# Patient Record
Sex: FEMALE | Race: WHITE | Employment: OTHER | URBAN - NONMETROPOLITAN AREA
[De-identification: names, ages, dates, MRNs, and addresses within clinical notes are randomized per-mention and may not be internally consistent; named-entity substitution may affect disease eponyms.]

---

## 2021-01-26 ENCOUNTER — OUTSIDE SERVICES (OUTPATIENT)
Dept: FAMILY MEDICINE CLINIC | Age: 86
End: 2021-01-26
Payer: MEDICARE

## 2021-01-26 DIAGNOSIS — R73.01 IFG (IMPAIRED FASTING GLUCOSE): ICD-10-CM

## 2021-01-26 DIAGNOSIS — M15.9 OSTEOARTHRITIS OF MULTIPLE JOINTS, UNSPECIFIED OSTEOARTHRITIS TYPE: ICD-10-CM

## 2021-01-26 DIAGNOSIS — E55.9 VITAMIN D DEFICIENCY: ICD-10-CM

## 2021-01-26 DIAGNOSIS — C44.90 SKIN CANCER: ICD-10-CM

## 2021-01-26 DIAGNOSIS — E78.5 HYPERLIPIDEMIA, UNSPECIFIED HYPERLIPIDEMIA TYPE: ICD-10-CM

## 2021-01-26 DIAGNOSIS — I10 ESSENTIAL HYPERTENSION: Primary | ICD-10-CM

## 2021-01-26 DIAGNOSIS — K59.00 CONSTIPATION, UNSPECIFIED CONSTIPATION TYPE: ICD-10-CM

## 2021-01-26 NOTE — PROGRESS NOTES
1/26/2021    Galen Blount  3/27/1928    This resident is being seen today for month visitt. She is a resident who has long-term medical conditions including essential hypertension constipation skin cancer of the forehead progressive debilitation hyperlipidemia vitamin D deficiency osteoarthritis and impaired fasting glucose. .  She is a 80 y.o. female resident who is being seen today for chronic conditions. Currently at this time she denies any complaints of chest pain or palpitations. Denies any headaches, any sore throat, coughing, or shortness of breath. No nausea, vomiting, constipation or diarrhea. No pain in lower extremities. No fever, or chills. No recent falls or syncopal events. Objective     Vital Signs: Pressure 142/80 pulse 86 respirations 24 temp 96.9 saturation 96% and weight is 172.4 pounds    Physical examination:Skin is essentially warm and dry. HEENT unremarkable. Neck is supple. Heart regular rate and rhythm. No rubs, gallops or murmurs noted. Lungs are clear to auscultation. No evidence of rhonchi, rales, or wheezing. Abdomen is soft, supple and non-tender. Bowel sounds are noted x4 quadrants. No rigidity, guarding or rebound tenderness. Negative Chavira's, negative McBurney's, negative Darrius's. Extremities; no true pitting edema. Pulses are adequate. No clubbing  or no cyanosis noted. Neurologically she  is alert and oriented x3. No evidence of paralysis or paresthesias noted. Utilizing a wheeled walker.     Diagnoses and all orders for this visit:    Essential hypertension    Constipation, unspecified constipation type    Skin cancer    IFG (impaired fasting glucose)    Hyperlipidemia, unspecified hyperlipidemia type    Vitamin D deficiency    Osteoarthritis of multiple joints, unspecified osteoarthritis type Patient's medications and labs are reviewed. She is due for blood work in 3 months including CBC CMP lipids TSH vitamin D hemoglobin A1c and then we will see her in the office following that lab work. Blanche Lubin, MONY - CNP        *Note was creating using voice recognition software.   The document was reviewed however grammatical errors may exist.

## 2021-03-01 RX ORDER — ERGOCALCIFEROL 1.25 MG/1
CAPSULE ORAL
Qty: 12 CAPSULE | Refills: 1 | Status: SHIPPED
Start: 2021-03-01 | End: 2021-10-14

## 2021-04-29 ENCOUNTER — OUTSIDE SERVICES (OUTPATIENT)
Dept: FAMILY MEDICINE CLINIC | Age: 86
End: 2021-04-29
Payer: MEDICARE

## 2021-04-29 DIAGNOSIS — E55.9 VITAMIN D DEFICIENCY: ICD-10-CM

## 2021-04-29 DIAGNOSIS — I10 ESSENTIAL HYPERTENSION: ICD-10-CM

## 2021-04-29 DIAGNOSIS — E78.5 HYPERLIPIDEMIA, UNSPECIFIED HYPERLIPIDEMIA TYPE: ICD-10-CM

## 2021-04-29 DIAGNOSIS — R79.89 ELEVATED TSH: Primary | ICD-10-CM

## 2021-04-29 DIAGNOSIS — K59.00 CONSTIPATION, UNSPECIFIED CONSTIPATION TYPE: ICD-10-CM

## 2021-04-30 NOTE — PROGRESS NOTES
4/29/2021    Ino [facility]  3/27/1928    This resident is being seen today for a 3-month follow-up evaluation visit. She is a resident who has long-term medical conditions including hypertension, constipation, skin cancer to the forehead, progressive debilitation, hyperlipidemia, vitamin D deficiency, osteoarthritis, and impaired fasting glucose. She is a 80 y.o. female resident who is being seen today for a 3-month follow-up evaluation. I did note on the most recent lab evaluation that her TSH was very slightly elevated at 5.99 and previously of 4.99. I did explain to her that if this does continue to trend upward, then we may consider initiation of Synthroid therapies. Resident and I furthermore discussed her cholesterol of 201 which was previously 186 and her LDL of 126 previously 118 and the benefits of exercise, given the fact that she cannot control her diet. Resident did indicate that she may go to the fifth floor or increase her walking in the hallways. She furthermore states that she has had some ongoing constipation, but specifically refuses treatment. She indicates that she has had no pain, headaches or dizziness, sore throat, coughing or shortness of breath, chest pain, nausea or vomiting, diarrhea, fever or chills, recent falls or syncopal events. Medications:  Vitamin D2 50,000 units weekly  Timolol 1 drop twice a day to both eyes  Voltaren gel 4 g to the right knee twice daily  Acetaminophen 650 mg every 4 hours as needed  Senokot-S twice daily  Norvasc 10 mg daily  Lisinopril 10 mg daily  Hydrochlorothiazide 12.5 mg daily      Objective     Vital Signs: /67 pulse 75 respirations 18 temperature 96.6 O2 91% weight 179 pounds      Physical examination:Skin is essentially warm and dry. HEENT unremarkable. Neck is supple. Heart regular rate and rhythm. No rubs, gallops or murmurs noted. Lungs are clear to auscultation. No evidence of rhonchi, rales, or wheezing.  Abdomen is soft, supple and non-tender. Bowel sounds are noted x4 quadrants. No rigidity, guarding or rebound tenderness. Negative Chavira's, negative McBurney's, negative Darrius's. Extremities; no true pitting edema. Pulses are adequate. No clubbing  or no cyanosis noted. Neurologically she  is alert and oriented x3. No evidence of paralysis or paresthesias noted. Diagnoses and all orders for this visit:    Elevated TSH  Comments:  Reevaluate TSH in 6 weeks and determine need for Synthroid    Hyperlipidemia, unspecified hyperlipidemia type  Comments:  Cholesterol high with LDL with diet and exercise discussed    Constipation, unspecified constipation type  Comments:  Resident refuses treatment    Vitamin D deficiency  Comments:  Stable with vitamin D supplementation and close observation of vitamin D    Essential hypertension  Comments:  Controlled with lisinopril with hydrochlorothiazide      Plan: Plan of care was discussed with the healthcare team with meds and labs reviewed. Recent labs from 4/20/2021 with a cholesterol of 201 previously of 186  previously 118 BUN/creatinine 19/0.78 with a GFR of 66 hemoglobin A1c 5.1 TSH 5.99 previously 4.99 H/H 13/38. 1. Again, resident and I did discuss exercise to help with her cholesterol, given the fact that she states she has no control of her diet. We also furthermore discussed the upward trend of her's TSH and the possible need for Synthroid if it continues to rise. I did ask that we have a repeat TSH in the course of 6 weeks for further reevaluation in this regard. Again, resident does refuse any treatment for her complaints of constipation. The only other recommendations at this time would be for a follow-up in 3 months with lab work prior to that visit which will include a CBC, CMP, lipid panel, vitamin D and TSH.   I will furthermore continue forth with the medical regimen as stated and I will track her intakes, monitor her weights and behaviors, and see her routinely and as needed with further orders forthcoming. AIDAN HEARD, APRN - CNP      *Note was creating using voice recognition software.   The document was reviewed however grammatical errors may exist.

## 2021-09-16 ENCOUNTER — OUTSIDE SERVICES (OUTPATIENT)
Dept: FAMILY MEDICINE CLINIC | Age: 86
End: 2021-09-16
Payer: MEDICARE

## 2021-09-16 DIAGNOSIS — K59.00 CONSTIPATION, UNSPECIFIED CONSTIPATION TYPE: ICD-10-CM

## 2021-09-16 DIAGNOSIS — M15.9 OSTEOARTHRITIS OF MULTIPLE JOINTS, UNSPECIFIED OSTEOARTHRITIS TYPE: ICD-10-CM

## 2021-09-16 DIAGNOSIS — E03.9 HYPOTHYROIDISM, UNSPECIFIED TYPE: Primary | ICD-10-CM

## 2021-09-16 DIAGNOSIS — I10 ESSENTIAL HYPERTENSION: ICD-10-CM

## 2021-09-16 DIAGNOSIS — Z91.14 HISTORY OF MEDICATION NONCOMPLIANCE: ICD-10-CM

## 2021-09-16 NOTE — PROGRESS NOTES
9/16/2021    Zachery Tucker  3/27/1928    This resident is being seen today for an acute evaluation visit. She is a resident who has long-term medical conditions including hypertension, constipation, skin cancer to the forehead, progressive debilitation, hyperlipidemia, vitamin D deficiency, osteoarthritis, and impaired fasting glucose. She is a 80 y.o. female resident who is being seen today for an acute evaluation with respect to noncompliance with medications. Resident was accompanied by her daughter on today's evaluation and we did discuss all of her medications. Daughter states that she is aware of the situation and has placed all of her mother's pills in a pillbox and states that she has been taking them regularly over the course of the past 4 days. She states that she has been a continue to monitor her very closely with respect to this. It is of note to mention that she did have an abnormally high TSH and we had previously given recommendations to upward titrate the levothyroxine, until we were aware of the fact that she had not been taking her medications properly. Medications:  Vitamin D2 50,000 units weekly  Timolol 1 drop twice a day to both eyes  Voltaren gel 4 g to the right knee twice daily  Acetaminophen 650 mg every 4 hours as needed  Norvasc 10 mg daily  Lisinopril 10 mg daily  Hydrochlorothiazide 12.5 mg daily  Synthroid 25 mcg daily      Objective     Vital Signs: /76 pulse 70 respirations 20 temperature 97.2 O2 94% weight 180.4 pounds      Physical examination:Skin is essentially warm and dry. HEENT hard of hearing but otherwise unremarkable. Neck is supple. Heart regular rate and rhythm. No rubs, gallops or murmurs noted. Lungs are clear to auscultation. No evidence of rhonchi, rales, or wheezing. Abdomen is soft, supple and non-tender. Bowel sounds are noted x4 quadrants. No rigidity, guarding or rebound tenderness.   Negative Chavira's, negative McBurney's, negative Darrius's. Extremities; no true pitting edema. Pulses are adequate. No clubbing  or no cyanosis noted. Neurologically she  is alert and oriented x3. No evidence of paralysis or paresthesias noted. Diagnoses and all orders for this visit:    Hypothyroidism, unspecified type  Comments:  Maintain current dose of Synthroid 25 mcg and repeat TSH in 2 months    History of medication noncompliance  Comments:  Resident's daughter to place pills in box daily    Osteoarthritis of multiple joints, unspecified osteoarthritis type  Comments:  Maintain vitamin supplementation    Constipation, unspecified constipation type  Comments:  Controlled with discontinuation of Senokot    Essential hypertension  Comments:  Controlled with Norvasc with lisinopril/hydrochlorothiazide      Plan: Plan of care was discussed with the healthcare team with meds and labs reviewed. Resident did have recent TSH 6.81, which has been trending for the last several months. I did discuss this with her daughter and the need for the administration of the Synthroid. Resident did agree to take the medication as directed. Daughter also asked if we could combine the lisinopril and hydrochlorothiazide, as it had been in times past. Staffing did call [employer] and had this taken care of. I did inform the resident and her daughter that it would be wise to obtain a repeat TSH in 2 months after she had been taking the medication properly and at that time I would like to obtain a vitamin D with a CBC, CMP and a B12 level. Resident was agreeable. I would furthermore like to follow-up with this resident in the course of 3 months to go over her labs. I will otherwise continue forth with her current plan of care as stated and I will see her routinely and as needed with further orders forthcoming. AIDAN HEARD, APRN - CNP      *Note was creating using voice recognition software.   The document was reviewed however grammatical errors may exist.

## 2021-10-14 RX ORDER — ERGOCALCIFEROL 1.25 MG/1
CAPSULE ORAL
Qty: 12 CAPSULE | Refills: 1 | Status: SHIPPED | OUTPATIENT
Start: 2021-10-14

## 2021-10-28 ENCOUNTER — OUTSIDE SERVICES (OUTPATIENT)
Dept: FAMILY MEDICINE CLINIC | Age: 86
End: 2021-10-28
Payer: MEDICARE

## 2021-10-28 DIAGNOSIS — E03.9 HYPOTHYROIDISM, UNSPECIFIED TYPE: ICD-10-CM

## 2021-10-28 DIAGNOSIS — E78.5 HYPERLIPIDEMIA, UNSPECIFIED HYPERLIPIDEMIA TYPE: ICD-10-CM

## 2021-10-28 DIAGNOSIS — R60.0 UNILATERAL EDEMA OF LOWER EXTREMITY: Primary | ICD-10-CM

## 2021-10-28 DIAGNOSIS — E55.9 VITAMIN D DEFICIENCY: ICD-10-CM

## 2021-10-28 DIAGNOSIS — Z91.14 HISTORY OF MEDICATION NONCOMPLIANCE: ICD-10-CM

## 2021-10-28 NOTE — PROGRESS NOTES
10/28/2021    Yudith Grye  3/27/1928    This resident is being seen today for a follow-up evaluation visit. She is a resident who has long-term medical conditions including hypertension, constipation, skin cancer to the forehead, progressive debilitation, hyperlipidemia, vitamin D deficiency, osteoarthritis, and impaired fasting glucose. She is a 80 y.o. female resident who is being seen today for a follow-up evaluation visit with resident seen in conjunction with her daughter. Resident admits to constipation from time to time but states that she feels that she is otherwise been doing well. Her daughter feels that the resident has been better in terms of her medication noncompliance. She does admit that there are still times when her mom does miss her pills. Resident denies any current complaints of pain, headaches or dizziness, sore throat, chest pain, coughing or shortness of breath, nausea or vomiting, constipation or diarrhea, dysuria or frequency, fever or chills, falls or syncopal events. Medications:  Vitamin D2 50,000 units weekly  Timolol 1 drop twice a day to both eyes  Voltaren gel 4 g to the right knee twice daily  Acetaminophen 650 mg every 4 hours as needed  Norvasc 10 mg daily  Lisinopril 10 mg daily  Hydrochlorothiazide 12.5 mg daily  Synthroid 25 mcg daily      Objective     Vital Signs: /73 pulse 85 respirations 18 temperature 97.2 O2 98% weight 176 pounds      Physical examination:Skin is essentially warm and dry. HEENT hard of hearing but otherwise unremarkable. Neck is supple. Heart regular rate and rhythm. No rubs, gallops or murmurs noted. Lungs are clear to auscultation. No evidence of rhonchi, rales, or wheezing. Abdomen is soft, supple and non-tender. Bowel sounds are noted x4 quadrants. No rigidity, guarding or rebound tenderness. Negative Chavira's, negative McBurney's, negative Darrius's.   Extremities; she does appear to have some degree of edema to the lower extremities with the left lower extremity being greater than the right without redness or warmth noted. Pulses are adequate. No clubbing  or no cyanosis noted. Neurologically she  is alert and oriented x3. No evidence of paralysis or paresthesias noted. Diagnoses and all orders for this visit:    Unilateral edema of lower extremity  Comments:  Ultrasound left lower extremity to rule out DVT    History of medication noncompliance  Comments:  Improved as noted per her daughter    Hypothyroidism, unspecified type  Comments:  Resident has repeat TSH pending and will maintain the current dosing as recommended    Vitamin D deficiency  Comments:  Stable with vitamin D supplementation with close observation of vitamin D level    Hyperlipidemia, unspecified hyperlipidemia type  Comments:  Currently controlled with close observation of lipid panel      Plan: Plan of care was discussed with the healthcare team with meds and labs reviewed. I do have concerns regarding her lower extremity and question of DVT. I am therefore going to recommend an ultrasound study to the left lower extremity to rule out any DVT formation. Furthermore, I am been to cancel her appointment scheduled for December and asked that she follow-up with me in the course of the next 3 months. At that time, I should have her labs to review and determine if she needs any changes with respect to her Synthroid dosing, as long as she maintains medication compliance. I will therefore maintain her plan of care as indicated at this time and I will see her routinely and as needed with further orders forthcoming. AIDAN HEARD, APRN - CNP      *Note was creating using voice recognition software.   The document was reviewed however grammatical errors may exist.

## 2022-01-27 ENCOUNTER — OUTSIDE SERVICES (OUTPATIENT)
Dept: FAMILY MEDICINE CLINIC | Age: 87
End: 2022-01-27
Payer: MEDICARE

## 2022-01-27 DIAGNOSIS — E87.1 HYPONATREMIA: Primary | ICD-10-CM

## 2022-01-27 DIAGNOSIS — I10 ESSENTIAL HYPERTENSION: ICD-10-CM

## 2022-01-27 DIAGNOSIS — E78.5 HYPERLIPIDEMIA, UNSPECIFIED HYPERLIPIDEMIA TYPE: ICD-10-CM

## 2022-01-27 DIAGNOSIS — K59.00 CONSTIPATION, UNSPECIFIED CONSTIPATION TYPE: ICD-10-CM

## 2022-01-27 DIAGNOSIS — M15.9 OSTEOARTHRITIS OF MULTIPLE JOINTS, UNSPECIFIED OSTEOARTHRITIS TYPE: ICD-10-CM

## 2022-01-27 NOTE — PROGRESS NOTES
1/27/2022    Rigobertofrances Zuniga  3/27/1928    This resident is being seen today for a follow-up evaluation visit. She is a resident who has long-term medical conditions including hypertension, constipation, skin cancer to the forehead, progressive debilitation, hyperlipidemia, vitamin D deficiency, osteoarthritis, and impaired fasting glucose. She is a 80 y.o. female resident who is being seen today for a follow-up evaluation/3-month visit. She was joined by her daughter and they have indicated that she has been taking her medications appropriately. It is of note to mention that upon my last evaluation that I had recommended an ultrasound study to her left lower extremity due to unilateral edema, which was negative for any DVT formation. Resident indicates that she had been doing well and denies any complaints of pain, headaches or dizziness, sore throat, chest pain, coughing or shortness of breath, nausea or vomiting, constipation or diarrhea, dysuria or frequency, fever or chills, falls or syncopal events. Medications:  Vitamin D2 50,000 units weekly  Timolol 1 drop twice a day to both eyes  Voltaren gel 4 g to the right knee twice daily  Acetaminophen 650 mg every 4 hours as needed  Norvasc 10 mg daily  Lisinopril 10 mg daily  Hydrochlorothiazide 12.5 mg daily  Synthroid 25 mcg daily      Objective     Vital Signs: /80 with a recheck of 136/71 pulse 76 respirations 20 temperature 97.2 O2 97 % weight 178.4 pounds      Physical examination:Skin is essentially warm and dry. HEENT hard of hearing but otherwise unremarkable. Neck is supple. Heart regular rate and rhythm. No rubs, gallops or murmurs noted. Lungs are clear to auscultation. No evidence of rhonchi, rales, or wheezing. Abdomen is soft, supple and non-tender. Bowel sounds are noted x4 quadrants. No rigidity, guarding or rebound tenderness. Negative Chavira's, negative McBurney's, negative Darrius's.   Extremities; left lower extremity greater than the right lower extremity, which appears to be chronic in nature at this point. Pulses are adequate. No clubbing  or no cyanosis noted. Neurologically she  is alert and oriented x3. No evidence of paralysis or paresthesias noted. Diagnoses and all orders for this visit:    Hyponatremia  Comments:  Currently relatively stable with close observation with low-dose diuretic management    Essential hypertension  Comments:  Controlled with Norvasc, lisinopril and hydrochlorothiazide    Osteoarthritis of multiple joints, unspecified osteoarthritis type  Comments:  Stable with Tylenol as needed    Constipation, unspecified constipation type  Comments:  Controlled and asymptomatic at this time    Hyperlipidemia, unspecified hyperlipidemia type  Comments:  Controlled with close observation of lipid panel      Plan: Plan of care was discussed with the healthcare team with meds and labs reviewed. Lipid panel within normal limits BUNs/creatinine 23/0.93 with GFR 53 sodium of 129 previously 128 H/H 12.2/36.1 vitamin D is 65 TSH 3.39. As stated, ultrasound work-up was negative for any DVT and her swelling does appear to be somewhat chronic in nature. I will therefore maintain her current medical regimen as directed and recommend some follow-up labs prior to her next visit with the inclusion of a CBC, CMP, vitamin D, lipid panel and a TSH. I will furthermore plan to follow-up with her in the course of 4 months. AIDAN HEARD, APRN - CNP      *Note was creating using voice recognition software.   The document was reviewed however grammatical errors may exist.

## 2022-05-26 ENCOUNTER — OUTSIDE SERVICES (OUTPATIENT)
Dept: FAMILY MEDICINE CLINIC | Age: 87
End: 2022-05-26
Payer: MEDICARE

## 2022-05-26 DIAGNOSIS — E78.5 HYPERLIPIDEMIA, UNSPECIFIED HYPERLIPIDEMIA TYPE: ICD-10-CM

## 2022-05-26 DIAGNOSIS — R60.0 UNILATERAL EDEMA OF LOWER EXTREMITY: ICD-10-CM

## 2022-05-26 DIAGNOSIS — E03.9 HYPOTHYROIDISM, UNSPECIFIED TYPE: ICD-10-CM

## 2022-05-26 DIAGNOSIS — H61.21 IMPACTED CERUMEN OF RIGHT EAR: Primary | ICD-10-CM

## 2022-05-26 DIAGNOSIS — I10 ESSENTIAL HYPERTENSION: ICD-10-CM

## 2022-05-26 NOTE — PROGRESS NOTES
5/26/2022    Ashley Camacho  3/27/1928    This resident is being seen today for a follow-up evaluation visit. She is a resident who has long-term medical conditions including hypertension, constipation, skin cancer to the forehead, progressive debilitation, hyperlipidemia, vitamin D deficiency, osteoarthritis, and impaired fasting glucose. She is a 80 y.o. female resident who is being seen today for a follow-up visit with resident remaining fairly alert and oriented and her daughter was present throughout the evaluation. She has been under assessment for some chronic edema to the left lower extremity, with ultrasound study work-up negative for DVT. She currently denies complaints in terms of pain, headaches or dizziness, sore throat, chest pain, coughing or shortness of breath, nausea or vomiting, constipation or diarrhea, dysuria or frequency, fever or chills, falls or syncopal events. Medications:  Vitamin D2 50,000 units weekly  Timolol 1 drop twice a day to both eyes  Voltaren gel 4 g to the right knee twice daily  Acetaminophen 650 mg every 4 hours as needed  Norvasc 10 mg daily  Lisinopril 10 mg daily  Hydrochlorothiazide 12.5 mg daily  Synthroid 25 mcg daily      Objective     Vital Signs: /73 pulse 80 respirations 18 temperature 96.7 O2 95 % weight 183 pounds      Physical examination:Skin is essentially warm and dry. HEENT hard of hearing with a right cerumen impaction but otherwise unremarkable. Neck is supple. Heart regular rate and rhythm. No rubs, gallops or murmurs noted. Lungs are clear to auscultation. No evidence of rhonchi, rales, or wheezing. Abdomen is soft, supple and non-tender. Bowel sounds are noted x4 quadrants. No rigidity, guarding or rebound tenderness. Negative Chavira's, negative McBurney's, negative Darrius's. Extremities; left lower extremity greater than the right lower extremity, which appears to be chronic in nature at this point. Pulses are adequate.  No clubbing or no cyanosis noted. Neurologically she  is alert and oriented x3. No evidence of paralysis or paresthesias noted. Diagnoses and all orders for this visit:    Impacted cerumen of right ear  Comments:  Debrox drops with 2 drops to the right ear daily for 5 days    Unilateral edema of lower extremity  Comments:  Essentially chronic in nature with resident maintaining low-dose diuretic management    Hypothyroidism, unspecified type  Comments:  Controlled with Synthroid    Hyperlipidemia, unspecified hyperlipidemia type  Comments:  Controlled with observation of lipid panel    Essential hypertension  Comments:  Controlled with Norvasc, lisinopril and hydrochlorothiazide      Plan: Plan of care was discussed with the healthcare team with meds and labs reviewed. Lipid panel within normal limits BUN/creatinine 20/0.88 with a GFR 56 TSH 4.36H/H12.2/35.6 vitamin D of 100. Her labs were furthermore noted and reviewed with the resident and her daughter. Regarding what appears to be some degree of a cerumen impaction to the right ear, I did recommend Debrox drops with 2 drops to the right ear daily for the course of 5 days which was discussed with her and her daughter. Resident will otherwise continue forth with her current medical management as directed with recommendations to follow-up with her in the course of 4 months. AIDAN HEARD, MONY - CNP      *Note was creating using voice recognition software.   The document was reviewed however grammatical errors may exist.

## 2023-01-11 LAB — TSH SERPL DL<=0.05 MIU/L-ACNC: 2.99 UIU/ML (ref 0.27–4.2)

## 2023-04-06 ENCOUNTER — OUTSIDE SERVICES (OUTPATIENT)
Dept: FAMILY MEDICINE CLINIC | Age: 88
End: 2023-04-06

## 2023-04-06 DIAGNOSIS — E55.9 VITAMIN D DEFICIENCY: ICD-10-CM

## 2023-04-06 DIAGNOSIS — E03.9 HYPOTHYROIDISM, UNSPECIFIED TYPE: ICD-10-CM

## 2023-04-06 DIAGNOSIS — K59.00 CONSTIPATION, UNSPECIFIED CONSTIPATION TYPE: ICD-10-CM

## 2023-04-06 DIAGNOSIS — R63.4 WEIGHT LOSS: Primary | ICD-10-CM

## 2023-04-06 DIAGNOSIS — M15.9 OSTEOARTHRITIS OF MULTIPLE JOINTS, UNSPECIFIED OSTEOARTHRITIS TYPE: ICD-10-CM

## 2023-04-06 NOTE — PROGRESS NOTES
wheezing. Abdomen is soft, supple and non-tender. Bowel sounds are noted x4 quadrants. No rigidity, guarding or rebound tenderness. Negative Chavira's, negative McBurney's, negative Darrius's. Extremities; left lower extremity greater than the right lower extremity, which appears to be chronic in nature at this point. Pulses are adequate. No clubbing  or no cyanosis noted. Neurologically she  is alert and oriented x3. No evidence of paralysis or paresthesias noted. Diagnoses and all orders for this visit:    Weight loss  Comments: We will continue to monitor closely with daughter aware and resident indicating she has had no changes in appetite    Hypothyroidism, unspecified type  Comments:  Status post upward titration of Synthroid with recent TSH of 2.990    Constipation, unspecified constipation type  Comments:  Currently controlled    Osteoarthritis of multiple joints, unspecified osteoarthritis type  Comments:  Stable with vitamin supplementation with Tylenol as needed    Vitamin D deficiency  Comments:  Currently controlled with vitamin D supplementation        Plan: Plan of care was discussed with the healthcare team with meds and labs reviewed. She did have labs noted and reviewed from October with lipid panel stable glucose of 105 sodium 134 BUN/creatinine 20/0.93 with a GFR 57 H/H11.8/35 TSH 2.990 previously of 6.62 vitamin D of 83. Her TSH does appear to be relatively stable at this time with the initiation of her low-dose Synthroid with upward titration. The only concern I have at this point is with respect to her weight loss, but she does admit that she only may be eats 1 solid meal a day. Her daughter is aware and they are going to continue to monitor her in terms of appetite.   She does not really want to continue to be followed with too often so I did agree to 4 months and prior to that she will have blood work completed with the inclusion of a CBC, CMP, vitamin D level, lipid panel, TSH and

## 2023-08-02 LAB
25(OH)D3 SERPL-MCNC: 37.6 NG/ML (ref 30–100)
ALBUMIN SERPL-MCNC: 3.7 G/DL (ref 3.5–5.2)
ALP SERPL-CCNC: 106 U/L (ref 35–104)
ALT SERPL-CCNC: 6 U/L (ref 0–32)
ANION GAP SERPL CALCULATED.3IONS-SCNC: 13 MMOL/L (ref 7–16)
AST SERPL-CCNC: 17 U/L (ref 0–31)
BASOPHILS # BLD: 0.05 K/UL (ref 0–0.2)
BASOPHILS NFR BLD: 1 % (ref 0–2)
BILIRUB SERPL-MCNC: 0.2 MG/DL (ref 0–1.2)
BUN SERPL-MCNC: 15 MG/DL (ref 6–23)
CALCIUM SERPL-MCNC: 8.9 MG/DL (ref 8.6–10.2)
CHLORIDE SERPL-SCNC: 96 MMOL/L (ref 98–107)
CHOLEST SERPL-MCNC: 179 MG/DL
CO2 SERPL-SCNC: 27 MMOL/L (ref 22–29)
CREAT SERPL-MCNC: 0.8 MG/DL (ref 0.5–1)
EOSINOPHIL # BLD: 0.43 K/UL (ref 0.05–0.5)
EOSINOPHILS RELATIVE PERCENT: 6 % (ref 0–6)
ERYTHROCYTE [DISTWIDTH] IN BLOOD BY AUTOMATED COUNT: 13 % (ref 11.5–15)
GFR SERPL CREATININE-BSD FRML MDRD: >60 ML/MIN/1.73M2
GLUCOSE SERPL-MCNC: 76 MG/DL (ref 74–99)
HCT VFR BLD AUTO: 40 % (ref 34–48)
HDLC SERPL-MCNC: 52 MG/DL
HGB BLD-MCNC: 12.4 G/DL (ref 11.5–15.5)
IMM GRANULOCYTES # BLD AUTO: <0.03 K/UL (ref 0–0.58)
IMM GRANULOCYTES NFR BLD: 0 % (ref 0–5)
LDLC SERPL CALC-MCNC: 114 MG/DL
LYMPHOCYTES NFR BLD: 1.7 K/UL (ref 1.5–4)
LYMPHOCYTES RELATIVE PERCENT: 25 % (ref 20–42)
MCH RBC QN AUTO: 30 PG (ref 26–35)
MCHC RBC AUTO-ENTMCNC: 31 G/DL (ref 32–34.5)
MCV RBC AUTO: 96.9 FL (ref 80–99.9)
MONOCYTES NFR BLD: 0.61 K/UL (ref 0.1–0.95)
MONOCYTES NFR BLD: 9 % (ref 2–12)
NEUTROPHILS NFR BLD: 59 % (ref 43–80)
NEUTS SEG NFR BLD: 3.99 K/UL (ref 1.8–7.3)
PLATELET # BLD AUTO: 233 K/UL (ref 130–450)
PMV BLD AUTO: 9.8 FL (ref 7–12)
POTASSIUM SERPL-SCNC: 3.6 MMOL/L (ref 3.5–5)
PROT SERPL-MCNC: 6.6 G/DL (ref 6.4–8.3)
RBC # BLD AUTO: 4.13 M/UL (ref 3.5–5.5)
SODIUM SERPL-SCNC: 136 MMOL/L (ref 132–146)
TRIGL SERPL-MCNC: 63 MG/DL
TSH SERPL DL<=0.05 MIU/L-ACNC: 3.92 UIU/ML (ref 0.27–4.2)
VIT B12 SERPL-MCNC: 381 PG/ML (ref 211–946)
VLDLC SERPL CALC-MCNC: 13 MG/DL
WBC OTHER # BLD: 6.8 K/UL (ref 4.5–11.5)

## 2023-08-17 ENCOUNTER — OUTSIDE SERVICES (OUTPATIENT)
Dept: FAMILY MEDICINE CLINIC | Age: 88
End: 2023-08-17

## 2023-08-17 DIAGNOSIS — I10 ESSENTIAL HYPERTENSION: ICD-10-CM

## 2023-08-17 DIAGNOSIS — E78.5 HYPERLIPIDEMIA, UNSPECIFIED HYPERLIPIDEMIA TYPE: ICD-10-CM

## 2023-08-17 DIAGNOSIS — R60.0 UNILATERAL EDEMA OF LOWER EXTREMITY: Primary | ICD-10-CM

## 2023-08-17 DIAGNOSIS — H57.9 MATTERY EYE: ICD-10-CM

## 2023-08-17 DIAGNOSIS — E55.9 VITAMIN D DEFICIENCY: ICD-10-CM

## 2023-08-17 NOTE — PROGRESS NOTES
otherwise continue with her current management as directed with close observation regarding her weight and I will see her routinely and as needed with further orders forthcoming. AIDAN HEARD, APRN - CNP      *Note was creating using voice recognition software.   The document was reviewed however grammatical errors may exist.

## 2023-08-31 LAB
ANION GAP SERPL CALCULATED.3IONS-SCNC: 14 MMOL/L (ref 7–16)
BUN SERPL-MCNC: 30 MG/DL (ref 6–23)
CALCIUM SERPL-MCNC: 9.3 MG/DL (ref 8.6–10.2)
CHLORIDE SERPL-SCNC: 97 MMOL/L (ref 98–107)
CO2 SERPL-SCNC: 28 MMOL/L (ref 22–29)
CREAT SERPL-MCNC: 1 MG/DL (ref 0.5–1)
GFR SERPL CREATININE-BSD FRML MDRD: 54 ML/MIN/1.73M2
GLUCOSE SERPL-MCNC: 113 MG/DL (ref 74–99)
POTASSIUM SERPL-SCNC: 4.2 MMOL/L (ref 3.5–5)
SODIUM SERPL-SCNC: 139 MMOL/L (ref 132–146)

## 2023-09-15 ENCOUNTER — OUTSIDE SERVICES (OUTPATIENT)
Dept: FAMILY MEDICINE CLINIC | Age: 88
End: 2023-09-15

## 2023-09-15 DIAGNOSIS — I10 ESSENTIAL HYPERTENSION: ICD-10-CM

## 2023-09-15 DIAGNOSIS — M15.9 OSTEOARTHRITIS OF MULTIPLE JOINTS, UNSPECIFIED OSTEOARTHRITIS TYPE: ICD-10-CM

## 2023-09-15 DIAGNOSIS — E03.9 HYPOTHYROIDISM, UNSPECIFIED TYPE: ICD-10-CM

## 2023-09-15 DIAGNOSIS — E55.9 VITAMIN D DEFICIENCY: ICD-10-CM

## 2023-09-15 DIAGNOSIS — R60.0 UNILATERAL EDEMA OF LOWER EXTREMITY: Primary | ICD-10-CM

## 2023-09-15 NOTE — PROGRESS NOTES
09/14/2023    [facility] Cathy Narvaez  3/27/1928    This resident is being seen today for a follow-up evaluation visit. She is a resident who has long-term medical conditions including hypertension, constipation, skin cancer to the forehead, progressive debilitation, hyperlipidemia, vitamin D deficiency, osteoarthritis, and impaired fasting glucose. She is a 80 y.o. female resident who is being seen today for a follow-up visit regarding left lower extremity edema with which I did complete an ultrasound to rule out a DVT and placed her on diuretic management. The ultrasound was negative and she really does not feel that she has had a lot of improvement but there was some. She indicates that there has been no redness or irritation or pain. She also denies any trauma to the area. She has no complaints of headaches or dizziness, sore throat, chest pain, coughing or shortness of breath, nausea or vomiting, constipation or diarrhea, fever or chills, falls or syncopal events. Medications:  Vitamin D2 50,000 units weekly  Timolol 1 drop twice a day to both eyes  Voltaren gel 4 g to the right knee twice daily  Acetaminophen 650 mg every 4 hours as needed  Norvasc 10 mg daily  Lisinopril 10 mg daily  Hydrochlorothiazide 12.5 mg daily  Synthroid 25 mcg daily        Objective     Vital Signs: /70 pulse 66 respirations 16 temperature 97.7 O2 97% weight 160 pounds (previously 184)      Physical examination:Skin is essentially warm and dry. HEENT unremarkable. Neck is supple. Heart regular rate and rhythm. No rubs, gallops or murmurs noted. Lungs are clear to auscultation. No evidence of rhonchi, rales, or wheezing. Abdomen is soft, supple and non-tender. Bowel sounds are noted x4 quadrants. No rigidity, guarding or rebound tenderness. Negative Chavira's, negative McBurney's, negative Darrius's.   Extremities; left lower extremity greater than the right lower extremity, which appears to be increasingly larger than

## 2023-10-03 LAB
ANION GAP SERPL CALCULATED.3IONS-SCNC: 19 MMOL/L (ref 7–16)
BUN SERPL-MCNC: 27 MG/DL (ref 6–23)
CALCIUM SERPL-MCNC: 9.7 MG/DL (ref 8.6–10.2)
CHLORIDE SERPL-SCNC: 96 MMOL/L (ref 98–107)
CO2 SERPL-SCNC: 21 MMOL/L (ref 22–29)
CREAT SERPL-MCNC: 0.9 MG/DL (ref 0.5–1)
GFR SERPL CREATININE-BSD FRML MDRD: 56 ML/MIN/1.73M2
GLUCOSE SERPL-MCNC: 93 MG/DL (ref 74–99)
POTASSIUM SERPL-SCNC: 3.7 MMOL/L (ref 3.5–5)
SODIUM SERPL-SCNC: 136 MMOL/L (ref 132–146)

## 2024-01-18 ENCOUNTER — OUTSIDE SERVICES (OUTPATIENT)
Dept: FAMILY MEDICINE CLINIC | Age: 89
End: 2024-01-18

## 2024-01-18 DIAGNOSIS — K59.00 CONSTIPATION, UNSPECIFIED CONSTIPATION TYPE: ICD-10-CM

## 2024-01-18 DIAGNOSIS — E03.9 HYPOTHYROIDISM, UNSPECIFIED TYPE: ICD-10-CM

## 2024-01-18 DIAGNOSIS — E55.9 VITAMIN D DEFICIENCY: ICD-10-CM

## 2024-01-18 DIAGNOSIS — E78.5 HYPERLIPIDEMIA, UNSPECIFIED HYPERLIPIDEMIA TYPE: ICD-10-CM

## 2024-01-18 DIAGNOSIS — R63.4 WEIGHT LOSS: Primary | ICD-10-CM

## 2024-01-18 NOTE — PROGRESS NOTES
1/18/2024      Ashley Greenes  3/27/1928    This resident is being seen today for a follow-up evaluation visit.  She is a resident who has long-term medical conditions including hypertension, constipation, skin cancer to the forehead, progressive debilitation, hyperlipidemia, vitamin D deficiency, osteoarthritis, and impaired fasting glucose.  She is a 89 y.o. female resident who is being seen today for a follow-up evaluation with this resident seen in conjunction with her daughter.  She has had about a 10 pound unintentional weight loss over the course of the past 4 months.  She feels that she has been eating the same and her daughter states that she brings her cheese and crackers for snacks as well as Ensure drinks once in a while.  It is of note to mention that she does appear to have some improvement with respect to the edema to her lower extremities.  She does state that she has had no headaches or dizziness, sore throat, chest pain, coughing or shortness of breath, nausea or vomiting, dysuria or frequency, fever or chills, falls or syncopal events.  She states that she has some constipation from time to time but essentially controlled.  Her daughter did bring up the idea of a flu shot and it does appear that she has not had any shots since 2022 and she specifically states that she has no interest in having any immunizations.            Medications:  Vitamin D2 50,000 units weekly  Timolol 1 drop twice a day to both eyes  Voltaren gel 4 g to the right knee twice daily  Acetaminophen 650 mg every 4 hours as needed  Norvasc 10 mg daily  Lisinopril 10 mg daily  Hydrochlorothiazide 12.5 mg daily  Synthroid 25 mcg daily        Objective     Vital Signs: /66 pulse 68 respirations 16 temperature 97.2 O2 99% weight 150.8 pounds (previously 160 and previously 184)      Physical examination:Skin is essentially warm and dry. HEENT unremarkable. Neck is supple. Heart regular rate and rhythm. No rubs, gallops or

## 2024-03-14 ENCOUNTER — OUTSIDE SERVICES (OUTPATIENT)
Dept: FAMILY MEDICINE CLINIC | Age: 89
End: 2024-03-14

## 2024-03-14 DIAGNOSIS — F03.C0 SEVERE DEMENTIA WITHOUT BEHAVIORAL DISTURBANCE, PSYCHOTIC DISTURBANCE, MOOD DISTURBANCE, OR ANXIETY, UNSPECIFIED DEMENTIA TYPE (HCC): ICD-10-CM

## 2024-03-14 DIAGNOSIS — R63.4 WEIGHT LOSS: ICD-10-CM
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
## 2024-03-14 DIAGNOSIS — Z78.9 DEFICIT IN ACTIVITIES OF DAILY LIVING (ADL): ICD-10-CM

## 2024-03-14 DIAGNOSIS — I10 ESSENTIAL HYPERTENSION: ICD-10-CM

## 2024-03-14 NOTE — PROGRESS NOTES
Norvasc, lisinopril, hydrochlorothiazide    Weight loss  Comments:  Stable over the course of the past couple of months                Plan: Plan of care was discussed with the healthcare team with meds and labs reviewed.  I did ask staff to obtain an MMSE score as a baseline status with which she only scored a 14 out of 30 which is indicative of severe cognitive dementia.  I did discuss this with both she and her daughter.  At this point I do recommend that she either be considered for assisted living or long-term care placement which will be dependent on the physical therapy evaluation.  If she does not meet the recommendations for assisted living then she will need long-term care and her daughter has already stated that they will not send her to [facility] for even short-term care for skilled therapy.  She indicates that she was there 5 years ago and that they had to go through a full report of people before she was even allowed to get her mom back into independent living and she has no intentions of doing that again.  The plan at this point is for our director of nursing to show the resident and her daughter what is available but the finances apparently need to be completed first.  She will therefore reside in independent living until that time.  She is otherwise can continue with the benefit of her physical therapy and be seen routinely and as needed with further orders forthcoming.      AIDAN HEARD, APRN - CNP      *Note was creating using voice recognition software.  The document was reviewed however grammatical errors may exist.

## 2024-04-18 ENCOUNTER — OUTSIDE SERVICES (OUTPATIENT)
Dept: FAMILY MEDICINE CLINIC | Age: 89
End: 2024-04-18

## 2024-04-18 DIAGNOSIS — E55.9 VITAMIN D DEFICIENCY: ICD-10-CM

## 2024-04-18 DIAGNOSIS — F03.C0 SEVERE DEMENTIA WITHOUT BEHAVIORAL DISTURBANCE, PSYCHOTIC DISTURBANCE, MOOD DISTURBANCE, OR ANXIETY, UNSPECIFIED DEMENTIA TYPE (HCC): Primary | ICD-10-CM

## 2024-04-18 DIAGNOSIS — M15.9 OSTEOARTHRITIS OF MULTIPLE JOINTS, UNSPECIFIED OSTEOARTHRITIS TYPE: ICD-10-CM

## 2024-04-18 DIAGNOSIS — E03.9 HYPOTHYROIDISM, UNSPECIFIED TYPE: ICD-10-CM

## 2024-04-18 DIAGNOSIS — R63.4 WEIGHT LOSS: ICD-10-CM

## 2024-04-18 DIAGNOSIS — Z78.9 DEFICIT IN ACTIVITIES OF DAILY LIVING (ADL): ICD-10-CM

## 2024-04-18 NOTE — PROGRESS NOTES
3/14/2024:    Ashley Win  3/27/1928    This resident is being seen today for an acute evaluation visit.  She is a resident who has long-term medical conditions including hypertension, constipation, skin cancer to the forehead, progressive debilitation, hyperlipidemia, vitamin D deficiency, osteoarthritis, and impaired fasting glucose.  She is a 89 y.o. female resident who is being seen today for an acute visit due to continuous confusion with safety concerns from staffing.  She is a resident who resides in independent living at this time in an apartment setting and there has been ongoing concerns with respect to her confusion and the fact that she close the  multiple times for different issues.  Her daughter was present during today's evaluation and she is aware of these issues as we did go over all of them today.  Her daughter basically indicates that she is aware of the situation but she would like to keep her in her apartment setting for as long as possible.  The resident is convinced that someone else just wants her apartment which is the reason that we would like her to move.  It is of note to mention that I did discuss my concerns with her and her daughter on the last evaluation and had recommended assisted living at that time.  The daughter was supposed to fill out some financial information to determine what assisted living apartment she will qualify for, which has not been completed at this time.  She does indicate that she probably will not get time to fill them out until much before June or July.  At this point, she is adamant that she would like to keep her in the independent living setting.  This is a resident who states that she does not feel that she has any concerns and offers no complaints of pain, headaches or dizziness, sore throat, chest pain, nausea or vomiting, dysuria or frequency, fever or chills, falls or syncopal events.  She does however admit that she has constipation from

## 2024-05-07 LAB
25(OH)D3 SERPL-MCNC: 35.7 NG/ML (ref 30–100)
ALBUMIN SERPL-MCNC: 3.4 G/DL (ref 3.5–5.2)
ALP SERPL-CCNC: 94 U/L (ref 35–104)
ALT SERPL-CCNC: 6 U/L (ref 0–32)
ANION GAP SERPL CALCULATED.3IONS-SCNC: 18 MMOL/L (ref 7–16)
AST SERPL-CCNC: 19 U/L (ref 0–31)
BASOPHILS # BLD: 0.03 K/UL (ref 0–0.2)
BASOPHILS NFR BLD: 1 % (ref 0–2)
BILIRUB SERPL-MCNC: 0.4 MG/DL (ref 0–1.2)
BUN SERPL-MCNC: 22 MG/DL (ref 6–23)
CALCIUM SERPL-MCNC: 9.3 MG/DL (ref 8.6–10.2)
CHLORIDE SERPL-SCNC: 102 MMOL/L (ref 98–107)
CO2 SERPL-SCNC: 21 MMOL/L (ref 22–29)
CREAT SERPL-MCNC: 1 MG/DL (ref 0.5–1)
EOSINOPHIL # BLD: 0.26 K/UL (ref 0.05–0.5)
EOSINOPHILS RELATIVE PERCENT: 5 % (ref 0–6)
ERYTHROCYTE [DISTWIDTH] IN BLOOD BY AUTOMATED COUNT: 12.7 % (ref 11.5–15)
GFR, ESTIMATED: 49 ML/MIN/1.73M2
GLUCOSE SERPL-MCNC: 67 MG/DL (ref 74–99)
HCT VFR BLD AUTO: 35.3 % (ref 34–48)
HGB BLD-MCNC: 11.4 G/DL (ref 11.5–15.5)
IMM GRANULOCYTES # BLD AUTO: 0.03 K/UL (ref 0–0.58)
IMM GRANULOCYTES NFR BLD: 1 % (ref 0–5)
LYMPHOCYTES NFR BLD: 1.53 K/UL (ref 1.5–4)
LYMPHOCYTES RELATIVE PERCENT: 30 % (ref 20–42)
MCH RBC QN AUTO: 31.1 PG (ref 26–35)
MCHC RBC AUTO-ENTMCNC: 32.3 G/DL (ref 32–34.5)
MCV RBC AUTO: 96.2 FL (ref 80–99.9)
MONOCYTES NFR BLD: 0.4 K/UL (ref 0.1–0.95)
MONOCYTES NFR BLD: 8 % (ref 2–12)
NEUTROPHILS NFR BLD: 56 % (ref 43–80)
NEUTS SEG NFR BLD: 2.91 K/UL (ref 1.8–7.3)
PLATELET # BLD AUTO: 184 K/UL (ref 130–450)
PMV BLD AUTO: 9.9 FL (ref 7–12)
POTASSIUM SERPL-SCNC: 4.2 MMOL/L (ref 3.5–5)
PROT SERPL-MCNC: 6 G/DL (ref 6.4–8.3)
RBC # BLD AUTO: 3.67 M/UL (ref 3.5–5.5)
SODIUM SERPL-SCNC: 141 MMOL/L (ref 132–146)
TSH SERPL DL<=0.05 MIU/L-ACNC: 2.83 UIU/ML (ref 0.27–4.2)
VIT B12 SERPL-MCNC: 415 PG/ML (ref 211–946)
WBC OTHER # BLD: 5.2 K/UL (ref 4.5–11.5)

## 2024-06-27 ENCOUNTER — OUTSIDE SERVICES (OUTPATIENT)
Dept: FAMILY MEDICINE CLINIC | Age: 89
End: 2024-06-27

## 2024-06-27 DIAGNOSIS — Z74.1 SELF-CARE DEFICIT FOR HYGIENE: ICD-10-CM

## 2024-06-27 DIAGNOSIS — Z78.9 DEFICIT IN ACTIVITIES OF DAILY LIVING (ADL): ICD-10-CM

## 2024-06-27 DIAGNOSIS — E78.5 HYPERLIPIDEMIA, UNSPECIFIED HYPERLIPIDEMIA TYPE: ICD-10-CM

## 2024-06-27 DIAGNOSIS — I10 ESSENTIAL HYPERTENSION: ICD-10-CM

## 2024-06-27 DIAGNOSIS — F03.C0 SEVERE DEMENTIA WITHOUT BEHAVIORAL DISTURBANCE, PSYCHOTIC DISTURBANCE, MOOD DISTURBANCE, OR ANXIETY, UNSPECIFIED DEMENTIA TYPE (HCC): Primary | ICD-10-CM

## 2024-06-28 NOTE — PROGRESS NOTES
6/27/2024        Ashley Win  3/27/1928    This resident is being seen today for a follow-up evaluation visit.  She is a resident who has long-term medical conditions including hypertension, constipation, skin cancer to the forehead, progressive debilitation, hyperlipidemia, vitamin D deficiency, osteoarthritis, and impaired fasting glucose.  She is a 89 y.o. female resident who is being seen today for a follow-up evaluation with some ongoing acute concerns regarding her increasing confusion.  We did complete an MMSE today which she has scored a 12 out of 30 and 2 months ago scored a 14 out of 30.  Her daughter was present on today's evaluation and I did express my concerns with respect to her underlying dementia.  Her daughter was still adamant that she feels that her mother is functioning fine in the independent living setting and she does not feel the need for her to go to an assisted living.  This is something we have been talking about for the past several months.  This resident still continues to have confusion and will call the  multiple times and becomes confused about where the dining room is and has expressed what could be a hallucination with a \"boy taking her keys and running.\"  I did have the director of nursing present on today's evaluation as well so that we could speak directly with this resident and her daughter about our concerns with respect to the resident and her safety.  We did explain to her that assisted living would be the best placement for the resident as she is becoming more of a liability.  We also explained to her to the resident and her daughter that if they refused then we would turn it over to corporate and they could make a decision as to what they wanted to proceed forth with which could include a 30-day notice to find a another facility.  They were more open to conversation at that point and were also willing to go look at a apartment to see what would be available

## 2024-07-08 RX ORDER — LISINOPRIL AND HYDROCHLOROTHIAZIDE 12.5; 1 MG/1; MG/1
1 TABLET ORAL DAILY
COMMUNITY
Start: 2023-12-07 | End: 2024-07-08 | Stop reason: SDUPTHER

## 2024-07-09 RX ORDER — LISINOPRIL AND HYDROCHLOROTHIAZIDE 12.5; 1 MG/1; MG/1
1 TABLET ORAL DAILY
Qty: 30 TABLET | Refills: 5 | Status: SHIPPED | OUTPATIENT
Start: 2024-07-09

## 2024-07-14 RX ORDER — LISINOPRIL AND HYDROCHLOROTHIAZIDE 12.5; 1 MG/1; MG/1
1 TABLET ORAL DAILY
Qty: 30 TABLET | Refills: 5 | OUTPATIENT
Start: 2024-07-14

## 2024-10-03 ENCOUNTER — OUTSIDE SERVICES (OUTPATIENT)
Dept: PRIMARY CARE CLINIC | Age: 89
End: 2024-10-03
Payer: MEDICARE

## 2024-10-03 DIAGNOSIS — I10 ESSENTIAL HYPERTENSION: ICD-10-CM

## 2024-10-03 DIAGNOSIS — L85.3 DRY SKIN: Primary | ICD-10-CM

## 2024-10-03 DIAGNOSIS — E55.9 VITAMIN D DEFICIENCY: ICD-10-CM

## 2024-10-03 DIAGNOSIS — E78.2 MIXED HYPERLIPIDEMIA: ICD-10-CM

## 2024-10-03 DIAGNOSIS — K59.00 CONSTIPATION, UNSPECIFIED CONSTIPATION TYPE: ICD-10-CM

## 2024-10-03 PROCEDURE — 99349 HOME/RES VST EST MOD MDM 40: CPT | Performed by: NURSE PRACTITIONER

## 2024-10-03 NOTE — PROGRESS NOTES
10/3/2024        Ashley Greenes  3/27/1928    This resident is being seen today for a follow-up evaluation visit.  She is a resident who has long-term medical conditions including hypertension, constipation, skin cancer to the forehead, progressive debilitation, hyperlipidemia, vitamin D deficiency, osteoarthritis, and impaired fasting glucose.  She is a 89 y.o. female resident who is being seen today for a follow-up visit with which Staff indicates that this resident has been stable when adjusting well to assisted living.  She does remain pleasantly confused and feels like she is [place] a resort.  Staff did complete a BIMS with which she scored a 2 and an MMSE which she scored a 12.  They did furthermore states that she had some dentures that did not fit and the dentist apparently tried to fit her for dentures after 2 extractions but they were unable to fit her and the resident has a gag and will leave them in.  She therefore remains on mechanical soft diet going forward and Staff indicates that she has had no issues with this recommendation.  She does have ongoing minimal edema to the bilateral lower extremities but she does refuse utilization of JUJU hose.  She has no concerns at this time regarding any coughing or shortness of breath, nausea or vomiting, constipation or diarrhea, fever or chills, falls or syncopal events.          Medications:  Amlodipine 10 mg daily  Furosemide 20 mg daily  Levothyroxine 37.5 mcg daily  Lisinopril/hydrochlorothiazide 10-12.5 mg daily  Potassium chloride 10 mill equivalents daily  Timolol maleate 0.5% 1 drop both eyes twice daily  Vitamin D2 50,000 units weekly  Tylenol as needed  Diclofenac gel 1% 2 g to the right knee twice daily        Objective     Vital Signs: BP 99/54 pulse 63 respirations 16 temperature 97.9 O2 95% weight 151.4 pounds           Physical examination:Skin is essentially warm and dry. HEENT ill fitting dentures with an odor to her breath but otherwise

## 2024-11-07 ENCOUNTER — OUTSIDE SERVICES (OUTPATIENT)
Dept: PRIMARY CARE CLINIC | Age: 89
End: 2024-11-07

## 2024-11-07 DIAGNOSIS — K59.00 CONSTIPATION, UNSPECIFIED CONSTIPATION TYPE: ICD-10-CM

## 2024-11-07 DIAGNOSIS — E55.9 VITAMIN D DEFICIENCY: ICD-10-CM

## 2024-11-07 DIAGNOSIS — I10 ESSENTIAL HYPERTENSION: Primary | ICD-10-CM

## 2024-11-07 DIAGNOSIS — E78.2 MIXED HYPERLIPIDEMIA: ICD-10-CM

## 2024-11-07 DIAGNOSIS — R29.6 FALLS: ICD-10-CM

## 2024-11-08 NOTE — PROGRESS NOTES
11/7/2024        Ashley Greenes  3/27/1928    This resident is being seen today for a follow-up evaluation visit.  She is a resident who has long-term medical conditions including hypertension, constipation, skin cancer to the forehead, progressive debilitation, hyperlipidemia, vitamin D deficiency, osteoarthritis, and impaired fasting glucose, hypercholesterolemia, knee strain, dermatitis, home related falls with progressive debilitation with a surgical history of gallbladder surgery and hysterectomy.  She is a 89 y.o. female resident who is being seen today for a follow-up evaluation with which this resident states that she is not mentally adjusting to her living arrangements in assisted living.  She states that she does not like to be \"told what to do.\"  I did explain to her that she is not necessarily being told what to do but just reminded as to when things need to be done or when her meals are available.  She states that at this time she has no complaints regarding any pain, headaches or dizziness, sore throat, chest pain, nausea or vomiting, constipation or diarrhea, fever or chills, falls or syncopal events.          Medications:  Amlodipine 10 mg daily  Furosemide 20 mg daily  Levothyroxine 37.5 mcg daily  Lisinopril/hydrochlorothiazide 10-12.5 mg daily  Potassium chloride 10 mill equivalents daily  Timolol maleate 0.5% 1 drop both eyes twice daily  Vitamin D2 50,000 units weekly  Tylenol as needed  Diclofenac gel 1% 2 g to the right knee twice daily        Objective     Vital Signs: /59 pulse 60 respirations 18 temperature 97.8 O2 94% weight 153.8 pounds           Physical examination:Skin is essentially warm and dry. HEENT ill fitting dentures with an odor to her breath but otherwise unremarkable. Neck is supple. Heart regular rate and rhythm. No rubs, gallops or murmurs noted.  Lungs are clear to auscultation.  No evidence of rhonchi, rales, or wheezing. Abdomen is soft, supple and non-tender.

## 2025-01-22 ENCOUNTER — OUTSIDE SERVICES (OUTPATIENT)
Dept: PRIMARY CARE CLINIC | Age: 89
End: 2025-01-22

## 2025-01-22 DIAGNOSIS — I10 ESSENTIAL HYPERTENSION: ICD-10-CM

## 2025-01-22 DIAGNOSIS — F03.C0 SEVERE DEMENTIA WITHOUT BEHAVIORAL DISTURBANCE, PSYCHOTIC DISTURBANCE, MOOD DISTURBANCE, OR ANXIETY, UNSPECIFIED DEMENTIA TYPE (HCC): ICD-10-CM

## 2025-01-22 DIAGNOSIS — E03.9 HYPOTHYROIDISM, UNSPECIFIED TYPE: ICD-10-CM

## 2025-01-22 DIAGNOSIS — F03.B0 MODERATE DEMENTIA WITHOUT BEHAVIORAL DISTURBANCE, PSYCHOTIC DISTURBANCE, MOOD DISTURBANCE, OR ANXIETY, UNSPECIFIED DEMENTIA TYPE (HCC): ICD-10-CM

## 2025-01-22 DIAGNOSIS — R05.1 ACUTE COUGH: Primary | ICD-10-CM

## 2025-01-23 NOTE — PROGRESS NOTES
1/22/2025        Ashley Win  3/27/1928    This resident is being seen today for a follow-up evaluation visit.  She is a resident who has long-term medical conditions including hypertension, constipation, skin cancer to the forehead, progressive debilitation, hyperlipidemia, vitamin D deficiency, osteoarthritis, and impaired fasting glucose, hypercholesterolemia, knee strain, dermatitis, home related falls with progressive debilitation with a surgical history of gallbladder surgery and hysterectomy.  She is a 89 y.o. female resident who is being seen today for a follow-up visit with which Staff indicates that this resident has had somewhat of a cough that sounds \"like croup\" and the symptoms started 1/14/2025.  Staff states that they have tested her multiple times for COVID and all have been negative and they did do a chest x-ray 1/16/2025 which was negative.  Of note to mention, she did have recent changes with respect to her Lasix and potassium which is on pulsed dosing at this time.  She is a resident who remains pleasantly confused who does state to me that she is congested and has a cough but denies sore throat, chest pain, shortness of breath, nausea or vomiting, constipation or diarrhea, fever or chills, syncopal events or seizure activity.  She did have a fall dated 12/29/2024 with no apparent injuries.          Medications:  Amlodipine 10 mg daily  Furosemide 20 mg M-W-F  Levothyroxine 37.5 mcg daily  Lisinopril/hydrochlorothiazide 10-12.5 mg daily  Memantine 10 mg twice daily  Potassium chloride 10 mill equivalents M-W-F  Timolol maleate 0.5% 1 drop both eyes twice daily  Vitamin D2 50,000 units weekly  Tylenol as needed  Diclofenac gel 1% 2 g to the right knee twice daily        Objective     Vital Signs: /60 pulse 60 respirations 16 temperature 97.7 O2 95% weight 148.4 pounds           Physical examination:Skin is essentially warm and dry. HEENT ill fitting dentures with an odor to her breath

## 2025-02-10 ENCOUNTER — OUTSIDE SERVICES (OUTPATIENT)
Dept: PRIMARY CARE CLINIC | Age: 89
End: 2025-02-10
Payer: MEDICARE

## 2025-02-10 DIAGNOSIS — I10 ESSENTIAL HYPERTENSION: Primary | ICD-10-CM

## 2025-02-10 DIAGNOSIS — E78.2 MIXED HYPERLIPIDEMIA: ICD-10-CM

## 2025-02-10 DIAGNOSIS — R05.1 ACUTE COUGH: ICD-10-CM

## 2025-02-10 DIAGNOSIS — K59.00 CONSTIPATION, UNSPECIFIED CONSTIPATION TYPE: ICD-10-CM

## 2025-02-10 DIAGNOSIS — E55.9 VITAMIN D DEFICIENCY: ICD-10-CM

## 2025-02-10 PROCEDURE — 99349 HOME/RES VST EST MOD MDM 40: CPT | Performed by: NURSE PRACTITIONER

## 2025-03-05 ENCOUNTER — OUTSIDE SERVICES (OUTPATIENT)
Dept: PRIMARY CARE CLINIC | Age: 89
End: 2025-03-05
Payer: MEDICARE

## 2025-03-05 DIAGNOSIS — I10 ESSENTIAL HYPERTENSION: ICD-10-CM

## 2025-03-05 DIAGNOSIS — E55.9 VITAMIN D DEFICIENCY: ICD-10-CM

## 2025-03-05 DIAGNOSIS — E03.9 HYPOTHYROIDISM, UNSPECIFIED TYPE: Primary | ICD-10-CM

## 2025-03-05 DIAGNOSIS — F03.C0 SEVERE DEMENTIA WITHOUT BEHAVIORAL DISTURBANCE, PSYCHOTIC DISTURBANCE, MOOD DISTURBANCE, OR ANXIETY, UNSPECIFIED DEMENTIA TYPE (HCC): ICD-10-CM

## 2025-03-05 DIAGNOSIS — E78.2 MIXED HYPERLIPIDEMIA: ICD-10-CM

## 2025-03-05 PROCEDURE — 99349 HOME/RES VST EST MOD MDM 40: CPT | Performed by: NURSE PRACTITIONER

## 2025-03-05 NOTE — PROGRESS NOTES
3/5/2025        Ashley Win  3/27/1928    This resident is being seen today for a follow-up evaluation visit.  She is a resident who has long-term medical conditions including hypertension, constipation, skin cancer to the forehead, progressive debilitation, hyperlipidemia, vitamin D deficiency, osteoarthritis, and impaired fasting glucose, hypercholesterolemia, knee strain, dermatitis, home related falls with progressive debilitation with a surgical history of gallbladder surgery and hysterectomy.  She is a 89 y.o. female resident who is being seen today for a follow-up visit with Staff indicates that she has been doing relatively well and they would consider her stable.  They did indicate she had a moist cough in January but they feel that it has cleared.  She does continue to require some assistance with her activities of daily living with staff setting her up for dressing and washing.  This is a resident who is ambulatory with the benefit of a walker.  She has no concerns on today's evaluation regarding any pain, headaches or dizziness, sore throat, chest pain, nausea or vomiting, constipation or diarrhea, fever or chills, falls or syncopal events.      Medications:  Amlodipine 10 mg daily  Furosemide 20 mg M-W-F  Levothyroxine 37.5 mcg daily  Lisinopril/hydrochlorothiazide 10-12.5 mg daily  Memantine 10 mg twice daily  Potassium chloride 10 mill equivalents M-W-F  Timolol maleate 0.5% 1 drop both eyes twice daily  Vitamin D2 50,000 units weekly  Tylenol as needed  Diclofenac gel 1% 2 g to the right knee twice daily        Objective     Vital Signs: /57 pulse 59 respirations 18 temperature 98.2 O2 94% weight 148.8 pounds           Physical examination:Skin is essentially warm and dry. HEENT ill fitting dentures with an odor to her breath but otherwise unremarkable. Neck is supple. Heart regular rate and rhythm. No rubs, gallops or murmurs noted.  Lungs are clear to auscultation.  No evidence of

## 2025-04-07 ENCOUNTER — OUTSIDE SERVICES (OUTPATIENT)
Dept: PRIMARY CARE CLINIC | Age: 89
End: 2025-04-07
Payer: MEDICARE

## 2025-04-07 DIAGNOSIS — F03.B0 MODERATE DEMENTIA WITHOUT BEHAVIORAL DISTURBANCE, PSYCHOTIC DISTURBANCE, MOOD DISTURBANCE, OR ANXIETY, UNSPECIFIED DEMENTIA TYPE (HCC): ICD-10-CM

## 2025-04-07 DIAGNOSIS — I49.9 IRREGULAR HEART RATE: Primary | ICD-10-CM

## 2025-04-07 DIAGNOSIS — I10 ESSENTIAL HYPERTENSION: ICD-10-CM
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
## 2025-04-07 DIAGNOSIS — E55.9 VITAMIN D DEFICIENCY: ICD-10-CM

## 2025-04-07 PROCEDURE — 99349 HOME/RES VST EST MOD MDM 40: CPT | Performed by: NURSE PRACTITIONER

## 2025-04-07 NOTE — PROGRESS NOTES
4/7/2025        Ashley Greenes  3/27/1928    This resident is being seen today for a follow-up evaluation visit.  She is a resident who has long-term medical conditions including hypertension, constipation, skin cancer to the forehead, progressive debilitation, hyperlipidemia, vitamin D deficiency, osteoarthritis, and impaired fasting glucose, hypercholesterolemia, knee strain, dermatitis, home related falls with progressive debilitation with a surgical history of gallbladder surgery and hysterectomy.  She is a 89 y.o. female resident who is being seen today for a follow-up evaluation with which this resident does remain fairly stable at this time with pleasant confusion and utilizes a walker for ambulation.  She does need set up in terms of dressing and washing herself but otherwise has been doing well and indicates that she actually likes the unit.  She has no complaints of pain, headaches or dizziness, sore throat, chest pain, coughing or shortness of breath, nausea or vomiting, constipation or diarrhea, fever or chills, syncopal events or seizure activity.  She did have a recent fall where she apparently slid off of the bench in her room on April 3 but there were no apparent injuries noted at the time.      Medications:  Amlodipine 10 mg daily  Furosemide 20 mg M-W-F  Levothyroxine 37.5 mcg daily  Lisinopril/hydrochlorothiazide 10-12.5 mg daily  Memantine 10 mg twice daily  Potassium chloride 10 mill equivalents M-W-F  Timolol maleate 0.5% 1 drop both eyes twice daily  Vitamin D2 50,000 units weekly  Tylenol as needed  Diclofenac gel 1% 2 g to the right knee twice daily        Objective     Vital Signs: /64 pulse 70 respirations 18 temperature 98.3 O2 95% weight 152.8 pounds           Physical examination:Skin is essentially warm and dry. HEENTunremarkable. Neck is supple. Heart irregularly irregular. No rubs, gallops or murmurs noted.  Lungs are clear to auscultation.  No evidence of rhonchi, rales, or

## 2025-05-25 RX ORDER — LISINOPRIL AND HYDROCHLOROTHIAZIDE 10; 12.5 MG/1; MG/1
1 TABLET ORAL DAILY
Qty: 90 TABLET | Refills: 0 | Status: SHIPPED | OUTPATIENT
Start: 2025-05-25

## 2025-06-11 ENCOUNTER — OUTSIDE SERVICES (OUTPATIENT)
Dept: PRIMARY CARE CLINIC | Age: 89
End: 2025-06-11

## 2025-06-11 DIAGNOSIS — F03.C0 SEVERE DEMENTIA WITHOUT BEHAVIORAL DISTURBANCE, PSYCHOTIC DISTURBANCE, MOOD DISTURBANCE, OR ANXIETY, UNSPECIFIED DEMENTIA TYPE (HCC): ICD-10-CM

## 2025-06-11 DIAGNOSIS — E78.2 MIXED HYPERLIPIDEMIA: ICD-10-CM

## 2025-06-11 DIAGNOSIS — E03.9 HYPOTHYROIDISM, UNSPECIFIED TYPE: ICD-10-CM

## 2025-06-11 DIAGNOSIS — E55.9 VITAMIN D DEFICIENCY: ICD-10-CM

## 2025-06-11 DIAGNOSIS — I10 ESSENTIAL HYPERTENSION: Primary | ICD-10-CM

## 2025-06-11 NOTE — PROGRESS NOTES
the right lower extremity, which has continued to improve but appears to be chronic at this time.  There does not appear to be any redness or warmth noted.  Pulses are palpable.  Pulses are adequate. No clubbing  or no cyanosis noted.  Neurologically she  is alert and oriented x 1-2.  No evidence of paralysis or paresthesias noted.    Diagnoses and all orders for this visit:    Essential hypertension  Comments:  Discontinue amlodipine continue with lisinopril/HCTZ    Mixed hyperlipidemia  Comments:  Currently controlled with observational lipid panel    Severe dementia without behavioral disturbance, psychotic disturbance, mood disturbance, or anxiety, unspecified dementia type (HCC)  Comments:  Stable on low-dose memantine    Hypothyroidism, unspecified type  Comments:  Currently controlled with levothyroxine    Vitamin D deficiency  Comments:  Controlled with vitamin supplementation                                  Plan: Plan of care was discussed with the healthcare team with meds and labs reviewed.  Glucose level 76 sodium 143 potassium 3.6 BUN 18 creatinine 1.1 GFR of 46 TSH 2.766 with the most recent EKG from April of normal sinus rhythm with a first-degree AV block.  She has been under assessment regarding blood pressure management with downward titration of her amlodipine.  Despite the downward titration she does continue to have low blood pressure readings so I will discontinue it at this time.  I will ask that staff monitor her blood pressure twice a day for the next 2 weeks.  She will furthermore continue with close observation regarding blood pressure management and continue with her current medical regimen as directed and I will see her routinely and as needed with further orders forthcoming.      AIDAN HEARD, APRN - CNP      *Note was creating using voice recognition software.  The document was reviewed however grammatical errors may exist.